# Patient Record
Sex: MALE | Race: WHITE | URBAN - METROPOLITAN AREA
[De-identification: names, ages, dates, MRNs, and addresses within clinical notes are randomized per-mention and may not be internally consistent; named-entity substitution may affect disease eponyms.]

---

## 2017-01-05 ENCOUNTER — TELEPHONE (OUTPATIENT)
Dept: FAMILY MEDICINE | Facility: OTHER | Age: 62
End: 2017-01-05

## 2017-01-05 DIAGNOSIS — I10 HYPERTENSION GOAL BP (BLOOD PRESSURE) < 140/90: Primary | ICD-10-CM

## 2017-01-05 NOTE — Clinical Note
[address]   [facility] [address]  Phone: [phone]  January 6, 2017      Arnulfo Lobo  [address] [place] [address]      Dear Arnulfo,    We care about your health and have reviewed your health plan including your medical conditions, medications, and lab results.  Based on this review, it is recommended that you follow up regarding the following health topic(s):  -Cholesterol  -Depression  -Breast Cancer Screening    We recommend you take the following action(s):  -schedule a FREE FLOAT MA-ONLY BLOOD PRESSURE APPOINTMENT within the next 1-4 weeks.  -schedule a LAB ONLY APPOINTMENT to recheck your: Lipids (fasting cholesterol - nothing to eat except water and/or meds for 8-10 hours) and BMP (basic metabolic panel) within the next 1-4 weeks.  If' you have had labs completed eslewhere, please let us know so we can update your records.    -Complete and return the attached PHQ-9 Form.  If your total score is greater than 9, please schedule a followup appointment.  If you answer Yes to question 9, call your clinic between the hours of 8 to 5.  You may also call the Suicide Hotline at 2-862-007-QPOA (4432) any time.     Please call us at the [facility] - [phone] (or use [employer]) to address the above recommendations.     Thank you for trusting [facility] and we appreciate the opportunity to serve you.  We look forward to supporting your healthcare needs in the future.    Healthy Regards,    Your Health Care Team  [facility]

## 2017-01-05 NOTE — TELEPHONE ENCOUNTER
Summary:    Patient is due/failing the following:   BMP, BP CHECK, LDL and PHQ9    Action needed:   Patient needs nurse only visit . and Patient needs fasting lab only appointment    Type of outreach:    Phone, left message for patient to call back.     Questions for provider review:    None                                                                                                                                    Radha Ewing       Chart routed to Care Team .        Panel Management Review      Patient has the following on his problem list:     Depression / Dysthymia review  PHQ-9 SCORE 10/30/2015 1/28/2016 7/18/2016   Total Score - - -   Total Score 6 2 3      Patient is due for:  PHQ9    Hypertension   Last three blood pressure readings:  BP Readings from Last 3 Encounters:   07/25/16 144/86   07/18/16 122/88   01/28/16 118/82     Blood pressure: Failed    HTN Guidelines:  Age 18-59 BP range:  Less than 140/90  Age 60-85 with Diabetes:  Less than 140/90  Age 60-85 without Diabetes:  less than 150/90      Composite cancer screening  Chart review shows that this patient is due/due soon for the following None

## 2017-02-02 NOTE — TELEPHONE ENCOUNTER
Left message for patient to call back. Please help him schedule an appointment.  Yoselyn Apple, [facility]

## 2017-02-20 ENCOUNTER — TELEPHONE (OUTPATIENT)
Dept: FAMILY MEDICINE | Facility: OTHER | Age: 62
End: 2017-02-20

## 2017-02-20 NOTE — TELEPHONE ENCOUNTER
Panel Management Review      Patient has the following on his problem list:     Depression / Dysthymia review  PHQ-9 SCORE 10/30/2015 1/28/2016 7/18/2016   Total Score - - -   Total Score 6 2 3      Patient is due for:  PHQ9      Composite cancer screening  Chart review shows that this patient is due/due soon for the following None  Summary:    Patient is due/failing the following:   BMP, hep c, LDL and PHQ9    Action needed:   Patient needs fasting lab only appointment    Type of outreach:    Phone, spoke to patient.  he stated that he lives in [place] now and just had this done, and wont need to come here for this.     Questions for provider review:    None                                                                                                                                    Soco Vazquez CMA (AAMA)       Chart routed to Provider as fyi .